# Patient Record
Sex: FEMALE | Race: WHITE
[De-identification: names, ages, dates, MRNs, and addresses within clinical notes are randomized per-mention and may not be internally consistent; named-entity substitution may affect disease eponyms.]

---

## 2017-12-17 ENCOUNTER — HOSPITAL ENCOUNTER (EMERGENCY)
Dept: HOSPITAL 39 - ER | Age: 62
Discharge: HOME | End: 2017-12-17
Payer: COMMERCIAL

## 2017-12-17 VITALS — DIASTOLIC BLOOD PRESSURE: 67 MMHG | TEMPERATURE: 100.4 F | SYSTOLIC BLOOD PRESSURE: 111 MMHG | OXYGEN SATURATION: 95 %

## 2017-12-17 DIAGNOSIS — J11.1: Primary | ICD-10-CM

## 2017-12-17 DIAGNOSIS — Z85.3: ICD-10-CM

## 2017-12-17 DIAGNOSIS — Z79.82: ICD-10-CM

## 2017-12-17 NOTE — ED.PDOC
History of Present Illness





- General


Chief Complaint: General


Stated Complaint: fever,cough,hurts all over


Time Seen by Provider: 12/17/17 10:52


Source: patient


Exam Limitations: no limitations





- History of Present Illness


Initial Comments: 





The patient is a 62-year-old  female presenting to the emergency room 

secondary to 24 hours of symptoms of cough with mild sore throat and 

generalized body aches and a mild headache.  Her appetite has been poor.  She 

is not taking any Tylenol or Motrin.  She does feel fatigued.  She does have 

some mild nausea.  No vomiting.


Timing/Duration: 24 hours


Severity: moderate


Improving Factors: nothing


Worsening Factors: nothing


Associated Symptoms: cough, fever/chills, loss of appetite, malaise, nausea/

vomiting, weakness


Allergies/Adverse Reactions: 


Allergies





Codeine Allergy (Verified 12/17/17 10:46)


 


Morphine Allergy (Verified 12/17/17 10:46)


 








Home Medications: 


Ambulatory Orders





Aspirin [Bam Low Dose] 81 mg PO DAILY 12/17/17 


Calcium 600 mg PO BID 12/17/17 


Ezetimibe [Zetia] 10 mg PO DAILY 12/17/17 


Oseltamivir Capsule [Tamiflu] 75 mg PO BID #10 cap 12/17/17 











Review of Systems





- Review of Systems


Constitutional: States: chills, fever, malaise, weakness


EENTM: States: nose congestion, throat pain


Respiratory: States: cough


Cardiology: States: no symptoms reported


Gastrointestinal/Abdominal: States: nausea


Genitourinary: States: no symptoms reported


Musculoskeletal: States: other - eneralized body aches


Skin: States: no symptoms reported


Neurological: States: headache


Endocrine: States: no symptoms reported


All other Systems: No Change from Baseline





Past Medical History (General)





- Patient Medical History


Hx Stroke: No


Hx Congestive Heart Failure: No


Hx Diabetes: No


Hx Cancer: Yes - Breast


Hx MRSA: Yes


MRSA Source:: Wound





- Vaccination History


Hx Influenza Vaccination: Yes


Hx Pneumococcal Vaccination: Yes





- Social History


Hx Tobacco Use: Yes





Family Medical History





- Family History


  ** Mother


Family History: Unknown


Living Status: Unknown





Physical Exam





- Physical Exam


General Appearance: Alert, No apparent distress


Eye Exam: bilateral normal


Ears, Nose, Throat: hearing grossly normal, nasal congestion, pharyngeal 

erythema


Neck: full range of motion, supple


Respiratory: lungs clear, normal breath sounds, no respiratory distress, no 

accessory muscle use


Cardiovascular/Chest: normal peripheral pulses, no edema


Peripheral Pulses: radial,right: 2+, radial,left: 2+, dorsalis pedis,right: 2+, 

dorsalis pedis,left: 2+


Gastrointestinal/Abdominal: non tender, soft


Rectal Exam: deferred


Back Exam: no CVA tenderness, no vertebral tenderness


Extremity: normal range of motion, non-tender, normal inspection, no pedal edema

, normal capillary refill


Neurologic: CNs II-XII nml as tested, alert, normal mood/affect, oriented x 3


Skin Exam: normal color


Comments: 





 Vital Signs - 24 hr











  12/17/17 12/17/17





  10:36 11:54


 


Temperature 99 F 100.4 F H


 


Pulse Rate [ 119 H 116 H





Left Brachial]  


 


Respiratory 16 19





Rate  


 


Blood Pressure 107/69 111/67





[Left Arm]  


 


O2 Sat by Pulse 97 95





Oximetry  














Progress





- Progress


Progress: 





12/17/17 12:02


the patient's a 62-year-old  female presenting with what appears to be 

flu A.  She has tested positive here.  Symptoms are consistent.  She'll be 

placed on Tamiflu twice daily for 5 days.  She needs to keep well-hydrated.  

She needs to use Motrin every 6-8 hours for the next few days.  She can add 

Tylenol if needed.  ER warnings were given for any significant worsening. She 

should follow up with her primary care doctor later this coming week.





Departure





- Departure


Clinical Impression: 


 Influenza A





Disposition: Discharge to Home or Self Care


Condition: Fair


Departure Forms:  ED Discharge - Pt. Copy, Patient Portal Self Enrollment


Instructions:  Influenza


Diet: regular diet


Activity: increase activity as tolerated


Referrals: 


Robb Diaz MD [Primary Care Provider] - 1-2 Weeks


Prescriptions: 


Oseltamivir Capsule [Tamiflu] 75 mg PO BID #10 cap


Home Medications: 


Ambulatory Orders





Aspirin [Bam Low Dose] 81 mg PO DAILY 12/17/17 


Calcium 600 mg PO BID 12/17/17 


Ezetimibe [Zetia] 10 mg PO DAILY 12/17/17 


Oseltamivir Capsule [Tamiflu] 75 mg PO BID #10 cap 12/17/17 








Additional Instructions: 


the patient's a 62-year-old  female presenting with what appears to be 

flu A.  She has tested positive here.  Symptoms are consistent.  She'll be 

placed on Tamiflu twice daily for 5 days.  She needs to keep well-hydrated.  

She needs to use Motrin every 6-8 hours for the next few days.  She can add 

Tylenol if needed.  ER warnings were given for any significant worsening. She 

should follow up with her primary care doctor later this coming week.

## 2020-07-02 ENCOUNTER — HOSPITAL ENCOUNTER (EMERGENCY)
Dept: HOSPITAL 39 - ER | Age: 65
Discharge: ANOTHER HOSPITAL | End: 2020-07-02
Payer: MEDICARE

## 2020-07-02 VITALS — TEMPERATURE: 97.5 F | SYSTOLIC BLOOD PRESSURE: 132 MMHG | OXYGEN SATURATION: 97 % | DIASTOLIC BLOOD PRESSURE: 66 MMHG

## 2020-07-02 DIAGNOSIS — I10: ICD-10-CM

## 2020-07-02 DIAGNOSIS — R79.89: ICD-10-CM

## 2020-07-02 DIAGNOSIS — Z79.82: ICD-10-CM

## 2020-07-02 DIAGNOSIS — R07.9: Primary | ICD-10-CM

## 2020-07-02 DIAGNOSIS — Z82.49: ICD-10-CM

## 2020-07-02 DIAGNOSIS — Z85.3: ICD-10-CM

## 2020-07-02 PROCEDURE — 85610 PROTHROMBIN TIME: CPT

## 2020-07-02 PROCEDURE — 85025 COMPLETE CBC W/AUTO DIFF WBC: CPT

## 2020-07-02 PROCEDURE — 83880 ASSAY OF NATRIURETIC PEPTIDE: CPT

## 2020-07-02 PROCEDURE — 85379 FIBRIN DEGRADATION QUANT: CPT

## 2020-07-02 PROCEDURE — 36415 COLL VENOUS BLD VENIPUNCTURE: CPT

## 2020-07-02 PROCEDURE — 82553 CREATINE MB FRACTION: CPT

## 2020-07-02 PROCEDURE — 83735 ASSAY OF MAGNESIUM: CPT

## 2020-07-02 PROCEDURE — 71046 X-RAY EXAM CHEST 2 VIEWS: CPT

## 2020-07-02 PROCEDURE — 85730 THROMBOPLASTIN TIME PARTIAL: CPT

## 2020-07-02 PROCEDURE — 84484 ASSAY OF TROPONIN QUANT: CPT

## 2020-07-02 PROCEDURE — 82550 ASSAY OF CK (CPK): CPT

## 2020-07-02 PROCEDURE — 80053 COMPREHEN METABOLIC PANEL: CPT

## 2020-07-02 PROCEDURE — 93005 ELECTROCARDIOGRAM TRACING: CPT

## 2020-07-02 NOTE — ED.PDOC
History of Present Illness





- General


Source: patient


Exam Limitations: no limitations





- History of Present Illness


Initial Comments: 





The patient is a 65-year-old  female presenting to the emergency room 

secondary to substernal chest pain that initially started as a mild sharp twinge

yesterday around 3 PM and has been intermittent since then.  It woke her up at 

around 3:30 AM this morning.  A mild sensation of shortness of breath and a mild

sensation of nausea.  No vomiting.  Mild radiation down the left arm this 

morning only.  No history of any coronary artery disease in her personally but 

her family does have a history of coronary artery disease.  No history of any 

pulmonary emboli or known aortic pathologies.  The patient has had breast cancer

in the past.  No fevers.  No productive cough.  No syncope or near syncope.  No 

diaphoresis.  At its worst it was a 4 out of 10 but is currently a 1 out of 10. 

She does normally take 81 mg aspirin daily.


Timing/Duration: unsure


Severity: mild


Improving Factors: nothing


Worsening Factors: nothing


Associated Symptoms: chest pain, nausea/vomiting, shortness of breath





<Gio Springer - Last Filed: 07/02/20 06:24>





<Dong Wing - Last Filed: 07/02/20 08:34>





- General


Chief Complaint: Chest Pain/MI


Stated Complaint: chest pain,nausea, SOB


Time Seen by Provider: 07/02/20 04:28





- History of Present Illness


Allergies/Adverse Reactions: 


Allergies





Codeine Allergy (Verified 12/17/17 10:46)


   


Morphine Allergy (Verified 12/17/17 10:46)


   





Home Medications: 


Ambulatory Orders





Aspirin [Bam Low Dose] 81 mg PO DAILY 12/17/17 


Calcium 600 mg PO BID 12/17/17 


Ezetimibe [Zetia] 10 mg PO DAILY 12/17/17 


Losartan Potassium 50 mg PO 07/02/20 











Review of Systems





- Review of Systems


Constitutional: States: malaise


EENTM: States: no symptoms reported


Respiratory: States: short of breath


Cardiology: States: chest pain


Gastrointestinal/Abdominal: States: nausea


Genitourinary: States: no symptoms reported


Musculoskeletal: States: no symptoms reported


Skin: States: no symptoms reported


Neurological: States: no symptoms reported


Endocrine: States: no symptoms reported


All other Systems: No Change from Baseline





<Gio Springer - Last Filed: 07/02/20 06:24>





Past Medical History (General)





- Patient Medical History


Hx Seizures: No


Hx Stroke: No


Hx Dementia: No


Hx Asthma: No


Hx of COPD: No


Hx Cardiac Disorders: No


Hx Congestive Heart Failure: No


Hx Pacemaker: No


Hx Hypertension: Yes


Hx Thyroid Disease: No


Hx Diabetes: No


Hx Gastroesophageal Reflux: No


Hx Renal Disease: No


Hx Cancer: Yes - Breast


Hx of HIV: No


Hx Hepatitis C: No


Hx MRSA: Yes - Vagina


MRSA Source:: Wound


Surgical History: other





- Vaccination History


Hx Tetanus, Diphtheria Vaccination: Yes


Hx Influenza Vaccination: Yes


Hx Pneumococcal Vaccination: Yes





- Social History


Hx Tobacco Use: Yes


Hx Chewing Tobacco Use: No


Hx Alcohol Use: No


Hx Substance Use: No


Hx Substance Use Treatment: No


Hx Depression: Yes


Feels Threatened In Home Enviroment: No


Feels Threatened In a Relationship: No


Hx Physical Abuse: No


Hx Emotional Abuse: No


Hx Suspected Abuse: No





- Female History


Patient is a Female of Child Bearing Age (10 -59 yrs old): No


Patient Pregnant: No





<Gio Springer - Last Filed: 07/02/20 06:24>





Family Medical History





- Family History


  ** Mother


Family History: Unknown


Living Status: Unknown





<Gio Springer - Last Filed: 07/02/20 06:24>





Physical Exam





- Physical Exam


General Appearance: Alert, Comfortable, No apparent distress


Eye Exam: bilateral normal


Ears, Nose, Throat: hearing grossly normal, normal ENT inspection


Neck: full range of motion, supple


Respiratory: chest non-tender, lungs clear, normal breath sounds, no respiratory

 distress, no accessory muscle use, other - Surgical sites noted


Cardiovascular/Chest: normal peripheral pulses, regular rate, rhythm, no edema


Peripheral Pulses: radial,right: 2+, radial,left: 2+


Gastrointestinal/Abdominal: non tender, soft, other - No definite palpable mass.


Rectal Exam: deferred


Extremity: normal range of motion, non-tender, normal inspection, no pedal ed

ema, no calf tenderness, normal capillary refill


Neurologic: CNs II-XII nml as tested, alert, normal mood/affect, oriented x 3


Skin Exam: normal color


Comments: 





                               Vital Signs - 24 hr











  07/02/20 07/02/20





  04:29 04:35


 


Temperature 97.5 F L 


 


Pulse Rate [ 94 H 94 H





Pulse Ox]  


 


Respiratory 18 





Rate  


 


Blood Pressure 149/76 





[Left Arm]  


 


O2 Sat by Pulse 96 





Oximetry  














<GianGio HALLE - Last Filed: 07/02/20 06:24>





Progress





- Progress


Progress: 





07/02/20 06:24


The patient is a 65-year-old  female presenting to the emergency room 

secondary to somewhat atypical intermittent chest pain starting yesterday 

afternoon, but waking her up in the middle of the night tonight.  No history of 

any coronary artery disease.  By the time she arrived here the chest pain was 

already back down to a 1.  The patient received Lovenox, aspirin and a GI 

cocktail.  Discomfort is not still present at this time.  She is uncertain if it

went away on its own or if the GI cocktail helped.  Vital signs have remained 

stable.  Telemetry monitoring has been reassuring.  Initial EKG is fairly 

reassuring.  The patient will need additional cardiac enzymes and repeat EKG.  

The patient will be picked up by the oncoming ER physician.


07/02/20 06:27


gio springer [id number]





- Results/Orders


Results/Orders: 





EKG shows normal sinus rhythm at 87 bpm.  Mild right axis deviation.  Left 

atrial dilation.  No obvious ST segment or T wave changes indicative of acute 

ischemia.  Borderline prolonged QT interval.  Borderline LVH criteria.





2 view chest x-ray shows no acute pathology.  Numerous chronic changes from 

previous surgeries are noted.  See report for details.





                                Laboratory Tests











  07/02/20 07/02/20 07/02/20





  04:43 04:43 04:43


 


WBC    7.9


 


RBC    4.31


 


Hgb    13.3


 


Hct    38.6


 


MCV    89.6


 


MCH    30.8


 


MCHC    34.4


 


RDW    14.4


 


Plt Count    245


 


MPV    7.4


 


Absolute Neuts (auto)    4.80


 


Absolute Lymphs (auto)    2.00


 


Absolute Monos (auto)    0.80


 


Absolute Eos (auto)    0.20


 


Absolute Basos (auto)    0.10


 


Neutrophils %    61.0


 


Lymphocytes %    25.4


 


Monocytes %    10.3 H


 


Eosinophils %    2.3


 


Basophils %    1.0


 


PT   9.8 


 


INR   < 1.00 


 


PTT (SP)   22.9 


 


D-Dimer, Quantitative   


 


Sodium  139  


 


Potassium  3.4 L  


 


Chloride  104  


 


Carbon Dioxide  27  


 


Anion Gap  11.4 L  


 


BUN  13  


 


Creatinine  1.05  


 


BUN/Creatinine Ratio  12.4  


 


Random Glucose  109 H  


 


Serum Osmolality  278.2  


 


Calcium  8.8  


 


Magnesium  2.2  


 


Total Bilirubin  0.4  


 


AST  17  


 


ALT  10  


 


Alkaline Phosphatase  74  


 


Creatine Kinase  90  


 


CK-MB (CK-2)  1.6  


 


CK-MB (CK-2) %  Not Reportable  


 


Troponin I  0.05  


 


B-Natriuretic Peptide  17.7  


 


Serum Total Protein  7.0  


 


Albumin  4.0  


 


Globulin  3.0  


 


Albumin/Globulin Ratio  1.3  














  07/02/20





  04:43


 


WBC 


 


RBC 


 


Hgb 


 


Hct 


 


MCV 


 


MCH 


 


MCHC 


 


RDW 


 


Plt Count 


 


MPV 


 


Absolute Neuts (auto) 


 


Absolute Lymphs (auto) 


 


Absolute Monos (auto) 


 


Absolute Eos (auto) 


 


Absolute Basos (auto) 


 


Neutrophils % 


 


Lymphocytes % 


 


Monocytes % 


 


Eosinophils % 


 


Basophils % 


 


PT 


 


INR 


 


PTT (SP) 


 


D-Dimer, Quantitative  349.0


 


Sodium 


 


Potassium 


 


Chloride 


 


Carbon Dioxide 


 


Anion Gap 


 


BUN 


 


Creatinine 


 


BUN/Creatinine Ratio 


 


Random Glucose 


 


Serum Osmolality 


 


Calcium 


 


Magnesium 


 


Total Bilirubin 


 


AST 


 


ALT 


 


Alkaline Phosphatase 


 


Creatine Kinase 


 


CK-MB (CK-2) 


 


CK-MB (CK-2) % 


 


Troponin I 


 


B-Natriuretic Peptide 


 


Serum Total Protein 


 


Albumin 


 


Globulin 


 


Albumin/Globulin Ratio 








Telemetry monitoring shows normal sinus rhythm.





<Gio Springer L - Last Filed: 07/02/20 06:24>





- Progress


Progress: 





07/02/20 08:27


Patient's second Troponin is elevated.  She has received lovenox and ASA.  

Discussed with patient the need to transfer.  Preference is [facility].  Case discussed

with Dr. Lerma who accepts transfer.  


07/02/20 08:33





Dong Wing #[id number]


07/02/20 08:33





                                Last Vital Signs











Temp  97.5 F L  07/02/20 08:00


 


Pulse  79   07/02/20 08:00


 


Resp  18   07/02/20 08:00


 


BP  132/66   07/02/20 08:00


 


Pulse Ox  97   07/02/20 08:00














- Results/Orders


Results/Orders: 


Repeat Trop elevated 0.18.


                             Laboratory Last Values











WBC  7.9 K/mm3 (4.8-10.8)   07/02/20  04:43    


 


RBC  4.31 M/mm3 (4.20-5.40)   07/02/20  04:43    


 


Hgb  13.3 gm/dL (12.0-16.0)   07/02/20  04:43    


 


Hct  38.6 % (36.0-47.0)   07/02/20  04:43    


 


MCV  89.6 fl (81.0-99.0)   07/02/20  04:43    


 


MCH  30.8 pg (27.0-31.0)   07/02/20  04:43    


 


MCHC  34.4 g/dL (33.0-37.0)   07/02/20  04:43    


 


RDW  14.4 % (11.5-14.5)   07/02/20  04:43    


 


Plt Count  245 K/mm3 (130-400)   07/02/20  04:43    


 


MPV  7.4 fl (7.40-10.4)   07/02/20  04:43    


 


Absolute Neuts (auto)  4.80 K/uL (1.8-6.8)   07/02/20  04:43    


 


Absolute Lymphs (auto)  2.00 K/uL (1.0-3.4)   07/02/20  04:43    


 


Absolute Monos (auto)  0.80 K/uL (0.2-0.8)   07/02/20  04:43    


 


Absolute Eos (auto)  0.20 K/uL (0.0-0.4)   07/02/20  04:43    


 


Absolute Basos (auto)  0.10 K/uL (0.0-0.1)   07/02/20  04:43    


 


Neutrophils %  61.0 % (42.0-78.0)   07/02/20  04:43    


 


Lymphocytes %  25.4 % (20.0-50.0)   07/02/20  04:43    


 


Monocytes %  10.3 % (2.0-9.0)  H  07/02/20  04:43    


 


Eosinophils %  2.3 % (1.0-5.0)   07/02/20  04:43    


 


Basophils %  1.0 % (0.0-2.0)   07/02/20  04:43    


 


PT  9.8 SECONDS (9.0-10.9)   07/02/20  04:43    


 


INR  < 1.00  (0.9-1.15)   07/02/20  04:43    


 


PTT (SP)  22.9 SECONDS (21.8-31.6)   07/02/20  04:43    


 


D-Dimer, Quantitative  349.0 ng/ml (131-400)   07/02/20  04:43    


 


Sodium  139 mmol/L (135-145)   07/02/20  04:43    


 


Potassium  3.4 mmol/L (3.6-5.0)  L  07/02/20  04:43    


 


Chloride  104 mmol/L (101-111)   07/02/20  04:43    


 


Carbon Dioxide  27 mmol/L (21-31)   07/02/20  04:43    


 


Anion Gap  11.4  (12-18)  L  07/02/20  04:43    


 


BUN  13 mg/dL (7-18)   07/02/20  04:43    


 


Creatinine  1.05 mg/dL (0.6-1.3)   07/02/20  04:43    


 


BUN/Creatinine Ratio  12.4  (10-20)   07/02/20  04:43    


 


Random Glucose  109 mg/dL ()  H  07/02/20  04:43    


 


Serum Osmolality  278.2 mOsm/L (275-295)   07/02/20  04:43    


 


Calcium  8.8 mg/dL (8.4-10.2)   07/02/20  04:43    


 


Magnesium  2.2 mg/dL (1.8-2.5)   07/02/20  04:43    


 


Total Bilirubin  0.4 mg/dL (0.2-1.0)   07/02/20  04:43    


 


AST  17 IU/L (10-42)   07/02/20  04:43    


 


ALT  10 IU/L (10-60)   07/02/20  04:43    


 


Alkaline Phosphatase  74 IU/L ()   07/02/20  04:43    


 


Creatine Kinase  90 IU/L ()   07/02/20  04:43    


 


CK-MB (CK-2)  1.6 ng/mL (0.0-4.4)   07/02/20  04:43    


 


CK-MB (CK-2) %  Not Reportable   07/02/20  04:43    


 


Troponin I  0.18 ng/mL (0.01-0.05)  H*  07/02/20  07:30    


 


B-Natriuretic Peptide  17.7 pg/ml (0-100)   07/02/20  04:43    


 


Serum Total Protein  7.0 gm/dL (6.4-8.2)   07/02/20  04:43    


 


Albumin  4.0 g/dl (3.2-5.5)   07/02/20  04:43    


 


Globulin  3.0 gm/dL (2.3-3.5)   07/02/20  04:43    


 


Albumin/Globulin Ratio  1.3  (1.1-1.9)   07/02/20  04:43    











<Commons,Umana - Last Filed: 07/02/20 08:34>





Departure





<Gio Springer HALLE - Last Filed: 07/02/20 06:24>





- Departure


Time of Disposition: 08:31





<Dong Wing - Last Filed: 07/02/20 08:34>





- Departure


Clinical Impression: 


 Troponin level elevated





Chest pain


Qualifiers:


 Chest pain type: unspecified Qualified Code(s): R07.9 - Chest pain, unspecified





Disposition: Transfer to Hospital


Condition: Fair


Departure Forms:  ED Discharge - Pt. Copy, Patient Portal Self Enrollment


Instructions:  DI for Chest Pain


Referrals: 


Robb Diaz MD [Primary Care Provider] - 1-2 Weeks


Home Medications: 


Ambulatory Orders





Aspirin [Bam Low Dose] 81 mg PO DAILY 12/17/17 


Calcium 600 mg PO BID 12/17/17 


Ezetimibe [Zetia] 10 mg PO DAILY 12/17/17 


Losartan Potassium 50 mg PO 07/02/20

## 2020-07-02 NOTE — RAD
EXAM: Chest 2 Views



HISTORY: mild chest pain, central 



COMPARISON: Chest two views 1/8/2013



TECHNIQUE: Chest two views PA lateral



FINDINGS:

Heart size and pulmonary vessels within normal limits.

Moderate symmetric bilateral mid/lower  lungs/chest density on PA

view reidentified most likely represents overlying breast/chest

wall attenuation artifact. 

No significant pleural effusion or pneumothorax.

No acute fracture.

Multiple right lower chest/breast surgical clips.



IMPRESSION: No radiographic evidence of acute chest disease.



Electronically signed by:  Casey Muñoz MD  7/2/2020 5:16 AM CDT

Workstation: [id number]

## 2020-08-06 ENCOUNTER — HOSPITAL ENCOUNTER (OUTPATIENT)
Dept: HOSPITAL 39 - GMA MATASK | Age: 65
End: 2020-08-06
Attending: FAMILY MEDICINE
Payer: MEDICARE

## 2020-08-06 DIAGNOSIS — R39.15: Primary | ICD-10-CM

## 2020-10-29 ENCOUNTER — HOSPITAL ENCOUNTER (OUTPATIENT)
Dept: HOSPITAL 39 - GMA MATASK | Age: 65
End: 2020-10-29
Attending: FAMILY MEDICINE
Payer: MEDICARE

## 2020-10-29 DIAGNOSIS — R30.0: Primary | ICD-10-CM

## 2020-11-04 ENCOUNTER — HOSPITAL ENCOUNTER (OUTPATIENT)
Dept: HOSPITAL 39 - GMA MATASK | Age: 65
End: 2020-11-04
Attending: FAMILY MEDICINE
Payer: MEDICARE

## 2020-11-04 DIAGNOSIS — I10: Primary | ICD-10-CM
